# Patient Record
Sex: FEMALE | Race: WHITE | Employment: UNEMPLOYED | URBAN - METROPOLITAN AREA
[De-identification: names, ages, dates, MRNs, and addresses within clinical notes are randomized per-mention and may not be internally consistent; named-entity substitution may affect disease eponyms.]

---

## 2017-01-12 RX ORDER — ALBUTEROL SULFATE 1.5 MG/3ML
SOLUTION RESPIRATORY (INHALATION)
Qty: 300 ML | Refills: 6 | Status: SHIPPED | OUTPATIENT
Start: 2017-01-12

## 2017-01-18 ENCOUNTER — TELEPHONE (OUTPATIENT)
Dept: FAMILY MEDICINE CLINIC | Facility: CLINIC | Age: 32
End: 2017-01-18

## 2017-01-20 NOTE — TELEPHONE ENCOUNTER
Patient calling and states she wants to see ENT for a constant post nasal drip and reoccurring ear infections.

## 2017-04-19 ENCOUNTER — HOSPITAL ENCOUNTER (EMERGENCY)
Facility: HOSPITAL | Age: 32
Discharge: HOME | End: 2017-04-19
Payer: COMMERCIAL

## 2017-04-19 ENCOUNTER — APPOINTMENT (OUTPATIENT)
Dept: GENERAL RADIOLOGY | Facility: HOSPITAL | Age: 32
End: 2017-04-19
Payer: COMMERCIAL

## 2017-04-19 VITALS
TEMPERATURE: 99 F | RESPIRATION RATE: 18 BRPM | WEIGHT: 238 LBS | HEIGHT: 65 IN | OXYGEN SATURATION: 96 % | DIASTOLIC BLOOD PRESSURE: 76 MMHG | SYSTOLIC BLOOD PRESSURE: 142 MMHG | BODY MASS INDEX: 39.65 KG/M2 | HEART RATE: 126 BPM

## 2017-04-19 DIAGNOSIS — J11.1 INFLUENZA: ICD-10-CM

## 2017-04-19 DIAGNOSIS — J45.901 ASTHMA EXACERBATION: Primary | ICD-10-CM

## 2017-04-19 PROCEDURE — 99283 EMERGENCY DEPT VISIT LOW MDM: CPT

## 2017-04-19 PROCEDURE — 94640 AIRWAY INHALATION TREATMENT: CPT

## 2017-04-19 PROCEDURE — 71020 XR CHEST PA + LAT CHEST (CPT=71020): CPT

## 2017-04-19 RX ORDER — IBUPROFEN 600 MG/1
600 TABLET ORAL EVERY 8 HOURS PRN
Qty: 10 TABLET | Refills: 0 | Status: SHIPPED | OUTPATIENT
Start: 2017-04-19 | End: 2017-12-20

## 2017-04-19 RX ORDER — PREDNISONE 20 MG/1
60 TABLET ORAL ONCE
Status: COMPLETED | OUTPATIENT
Start: 2017-04-19 | End: 2017-04-19

## 2017-04-19 RX ORDER — IPRATROPIUM BROMIDE AND ALBUTEROL SULFATE 2.5; .5 MG/3ML; MG/3ML
3 SOLUTION RESPIRATORY (INHALATION) ONCE
Status: COMPLETED | OUTPATIENT
Start: 2017-04-19 | End: 2017-04-19

## 2017-04-19 RX ORDER — OSELTAMIVIR PHOSPHATE 75 MG/1
75 CAPSULE ORAL 2 TIMES DAILY
Qty: 10 CAPSULE | Refills: 0 | Status: SHIPPED | OUTPATIENT
Start: 2017-04-19 | End: 2017-04-24

## 2017-04-19 RX ORDER — ALBUTEROL SULFATE 90 UG/1
2 AEROSOL, METERED RESPIRATORY (INHALATION) EVERY 4 HOURS PRN
Qty: 1 INHALER | Refills: 0 | Status: SHIPPED | OUTPATIENT
Start: 2017-04-19 | End: 2017-05-19

## 2017-04-19 RX ORDER — PREDNISONE 20 MG/1
60 TABLET ORAL DAILY
Qty: 12 TABLET | Refills: 0 | Status: SHIPPED | OUTPATIENT
Start: 2017-04-19 | End: 2017-04-23

## 2017-04-20 NOTE — ED INITIAL ASSESSMENT (HPI)
Patient with fevers this morning, max temp 104 at home. She also c/o cough and congestion. She took Mucinex and Advil at home.

## 2017-04-20 NOTE — ED PROVIDER NOTES
Patient Seen in: [facility] Emergency Department    History   Patient presents with:  Fever (infectious)  Cough/URI    Stated Complaint: FEVER    HPI    Patient is a pleasant 28-year-old who presents the ER, she has a history of asthma which is mild Spacer/Aero Chamber Mouthpiece Does not apply Misc,  Use with inhaler   ibuprofen 600 MG Oral Tab,  Take 1 tablet (600 mg total) by mouth every 8 (eight) hours as needed for Pain.    ALBUTEROL SULFATE 1.25 MG/3ML Inhalation Nebu Soln,  USE 1 VIAL VIA NEBULI Wt 107. 956 kg  BMI 39.61 kg/m2  SpO2 96%  LMP 03/29/2017 (Exact Date)  Breastfeeding? Unknown        Physical Exam    GENERAL APPEARANCE:     Well developed, well nourished, alert       Head: Normocephalic and atraumatic.      Sclera anicteric, conjunctiva also c/o cough and congestion. She took Mucinex and Advil at home. FINDINGS:  LUNGS:  No focal consolidation. Normal vascularity. CARDIAC:  Normal size cardiac silhouette. MEDIASTINUM:  Normal. PLEURA:  Normal.  No pleural effusions.  BONES:  Normal fo for Wheezing. Qty: 1 Inhaler Refills: 0    Spacer/Aero Chamber Mouthpiece Does not apply Misc  Use with inhaler  Qty: 1 each Refills: 0    ibuprofen 600 MG Oral Tab  Take 1 tablet (600 mg total) by mouth every 8 (eight) hours as needed for Pain.   Qty: 10

## 2017-10-24 ENCOUNTER — HOSPITAL ENCOUNTER (OUTPATIENT)
Age: 32
Discharge: HOME | End: 2017-10-24
Attending: FAMILY MEDICINE
Payer: COMMERCIAL

## 2017-10-24 ENCOUNTER — APPOINTMENT (OUTPATIENT)
Dept: GENERAL RADIOLOGY | Age: 32
End: 2017-10-24
Attending: FAMILY MEDICINE
Payer: COMMERCIAL

## 2017-10-24 VITALS
BODY MASS INDEX: 40.82 KG/M2 | WEIGHT: 245 LBS | RESPIRATION RATE: 20 BRPM | SYSTOLIC BLOOD PRESSURE: 130 MMHG | HEIGHT: 65 IN | TEMPERATURE: 98 F | DIASTOLIC BLOOD PRESSURE: 82 MMHG | HEART RATE: 94 BPM | OXYGEN SATURATION: 98 %

## 2017-10-24 DIAGNOSIS — R09.82 POST-NASAL DRIP: ICD-10-CM

## 2017-10-24 DIAGNOSIS — J20.9 BRONCHITIS WITH BRONCHOSPASM: Primary | ICD-10-CM

## 2017-10-24 PROCEDURE — 71020 XR CHEST PA + LAT CHEST (CPT=71020): CPT | Performed by: FAMILY MEDICINE

## 2017-10-24 PROCEDURE — 99213 OFFICE O/P EST LOW 20 MIN: CPT

## 2017-10-24 PROCEDURE — 99204 OFFICE O/P NEW MOD 45 MIN: CPT

## 2017-10-24 PROCEDURE — 81025 URINE PREGNANCY TEST: CPT | Performed by: FAMILY MEDICINE

## 2017-10-24 RX ORDER — ALBUTEROL SULFATE 90 UG/1
2 AEROSOL, METERED RESPIRATORY (INHALATION) EVERY 4 HOURS PRN
Qty: 1 INHALER | Refills: 0 | Status: SHIPPED | OUTPATIENT
Start: 2017-10-24 | End: 2017-11-02

## 2017-10-24 RX ORDER — ALBUTEROL SULFATE 2.5 MG/3ML
2.5 SOLUTION RESPIRATORY (INHALATION) EVERY 4 HOURS PRN
Qty: 30 AMPULE | Refills: 0 | Status: SHIPPED | OUTPATIENT
Start: 2017-10-24 | End: 2017-11-23

## 2017-10-24 RX ORDER — FLUTICASONE PROPIONATE AND SALMETEROL 250; 50 UG/1; UG/1
1 POWDER RESPIRATORY (INHALATION) 2 TIMES DAILY
Qty: 1 PACKAGE | Refills: 0 | Status: SHIPPED | OUTPATIENT
Start: 2017-10-24 | End: 2017-11-02

## 2017-10-24 RX ORDER — ALPRAZOLAM 0.5 MG/1
0.5 TABLET ORAL NIGHTLY PRN
COMMUNITY

## 2017-10-24 RX ORDER — PREDNISONE 10 MG/1
TABLET ORAL
Qty: 30 TABLET | Refills: 0 | Status: SHIPPED | OUTPATIENT
Start: 2017-10-24 | End: 2017-11-02 | Stop reason: ALTCHOICE

## 2017-10-24 NOTE — ED INITIAL ASSESSMENT (HPI)
Pt c/o URI s/s including sore throat, nasal congestion, post nasal drip and mild cough x 2 weeks.  Pt was in [place] visiting family for 5 days, went to urgent care in [facility]) on 10/15/17, was prescribed Breo Ellipta inhaler albuterol inhaler and Clarithromyc

## 2017-10-24 NOTE — ED PROVIDER NOTES
Patient Seen in: [facility]    History   Patient presents with:  Cough/URI    Stated Complaint: CHEST COLD STARTING TWO WEEKS AGO, FEVER TODAY    HPI  29-year-old female coming in with complaints of URI symptoms - sore thro above.    PSFH elements reviewed from today and agreed except as otherwise stated in HPI.     Physical Exam   ED Triage Vitals [10/24/17 1704]  BP: 130/82  Pulse: 94  Resp: 20  Temp: 97.7 °F (36.5 °C)  Temp src: Temporal  SpO2: 98 %  O2 Device: None (Room a 9,10: 10 mg/1 pill once daily          Dispense:  30 tablet          Refill:  0      fluticasone-salmeterol (ADVAIR DISKUS) 250-50 MCG/DOSE Inhalation Aerosol Powder, Breath Activated          Sig: Inhale 1 puff into the lungs 2 (two) times daily. taking these medications    albuterol sulfate (2.5 MG/3ML) 0.083% Inhalation Nebu Soln  Take 3 mL (2.5 mg total) by nebulization every 4 (four) hours as needed for Wheezing or Shortness of Breath., Normal, Disp-30 ampule, R-0    !!  Albuterol Sulfate HFA 10

## 2017-11-01 ENCOUNTER — TELEPHONE (OUTPATIENT)
Dept: FAMILY MEDICINE CLINIC | Facility: CLINIC | Age: 32
End: 2017-11-01

## 2017-11-01 NOTE — TELEPHONE ENCOUNTER
UNABLE to conform NEW PT sick appt w/Luis Enrique Hopkins on 11/2/17. Rhoda Spar Rhoda Spar Voice mail FULL

## 2017-11-02 ENCOUNTER — OFFICE VISIT (OUTPATIENT)
Dept: FAMILY MEDICINE CLINIC | Facility: CLINIC | Age: 32
End: 2017-11-02

## 2017-11-02 VITALS
HEIGHT: 65 IN | RESPIRATION RATE: 12 BRPM | HEART RATE: 68 BPM | TEMPERATURE: 98 F | DIASTOLIC BLOOD PRESSURE: 68 MMHG | SYSTOLIC BLOOD PRESSURE: 120 MMHG | BODY MASS INDEX: 40.82 KG/M2 | WEIGHT: 245 LBS

## 2017-11-02 DIAGNOSIS — R05.9 COUGH: Primary | ICD-10-CM

## 2017-11-02 DIAGNOSIS — J45.31 MILD PERSISTENT ASTHMA WITH EXACERBATION: ICD-10-CM

## 2017-11-02 DIAGNOSIS — M13.0 POLYARTHROPATHY: ICD-10-CM

## 2017-11-02 PROCEDURE — 99203 OFFICE O/P NEW LOW 30 MIN: CPT | Performed by: PHYSICIAN ASSISTANT

## 2017-11-02 RX ORDER — PHENTERMINE HYDROCHLORIDE 37.5 MG/1
37.5 TABLET ORAL
COMMUNITY
Start: 2017-09-28 | End: 2018-02-05

## 2017-11-02 NOTE — PROGRESS NOTES
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  Symptoms have been present for a few weeks; she was placed on antibiotics and given prednisone; she total) by nebulization every 4 (four) hours as needed for Wheezing or Shortness of Breath.  Disp: 30 ampule Rfl: 0   Spacer/Aero Chamber Mouthpiece Does not apply Misc Use with inhaler Disp: 1 each Rfl: 0   ibuprofen 600 MG Oral Tab Take 1 tablet (600 mg to No edema, bleeding, or rhinorrhea; no septal deviation   Throat: No erythema; no oral lesions or exudates; tonsils not enlarged   Eyes: PERRLA; no ulcers, abrasions, or FBs; lids normal; no edema; conjunctiva clear    Sinus: No TTP over frontal/ethmoid sin unspecified     Asthma exacerbation     Panic anxiety syndrome     Depression     Polyarthropathy     Chronic midline low back pain with sciatica      No orders of the defined types were placed in this encounter.       Signed Prescriptions Disp Refills    F

## 2017-11-02 NOTE — PATIENT INSTRUCTIONS
What Is Arthritis? Arthritis is a disease that affects the joints. Joints are the parts where bones meet and move. It can affect any joint in your body. There are many types of arthritis, including osteoarthritis, rheumatoid arthritis, gout and lupus.  I © 6912-5825 [employer] [address]. [address] [facility], [facility] [place]. All rights reserved. This information is not intended as a substitute for professional medical care. Always follow your healthcare professional's instructions.

## 2017-12-04 ENCOUNTER — OFFICE VISIT (OUTPATIENT)
Dept: FAMILY MEDICINE CLINIC | Facility: CLINIC | Age: 32
End: 2017-12-04

## 2017-12-04 VITALS
TEMPERATURE: 99 F | SYSTOLIC BLOOD PRESSURE: 110 MMHG | DIASTOLIC BLOOD PRESSURE: 84 MMHG | BODY MASS INDEX: 42.38 KG/M2 | HEIGHT: 65 IN | WEIGHT: 254.38 LBS | HEART RATE: 84 BPM | RESPIRATION RATE: 16 BRPM

## 2017-12-04 DIAGNOSIS — F32.4 MAJOR DEPRESSIVE DISORDER WITH SINGLE EPISODE, IN PARTIAL REMISSION (HCC): ICD-10-CM

## 2017-12-04 DIAGNOSIS — R73.01 ABNORMAL FASTING GLUCOSE: ICD-10-CM

## 2017-12-04 DIAGNOSIS — D22.9 ATYPICAL MOLE: Primary | ICD-10-CM

## 2017-12-04 DIAGNOSIS — F41.0 PANIC ANXIETY SYNDROME: ICD-10-CM

## 2017-12-04 DIAGNOSIS — R63.5 ABNORMAL WEIGHT GAIN: ICD-10-CM

## 2017-12-04 DIAGNOSIS — Z01.419 WELL WOMAN EXAM WITH ROUTINE GYNECOLOGICAL EXAM: ICD-10-CM

## 2017-12-04 DIAGNOSIS — M25.50 ARTHRALGIA, UNSPECIFIED JOINT: ICD-10-CM

## 2017-12-04 DIAGNOSIS — G89.29 CHRONIC MIDLINE LOW BACK PAIN WITH SCIATICA, SCIATICA LATERALITY UNSPECIFIED: ICD-10-CM

## 2017-12-04 DIAGNOSIS — M13.0 POLYARTHROPATHY: ICD-10-CM

## 2017-12-04 DIAGNOSIS — M54.40 CHRONIC MIDLINE LOW BACK PAIN WITH SCIATICA, SCIATICA LATERALITY UNSPECIFIED: ICD-10-CM

## 2017-12-04 DIAGNOSIS — M43.16 SPONDYLOLISTHESIS OF LUMBAR REGION: ICD-10-CM

## 2017-12-04 PROCEDURE — 87624 HPV HI-RISK TYP POOLED RSLT: CPT | Performed by: FAMILY MEDICINE

## 2017-12-04 PROCEDURE — 88175 CYTOPATH C/V AUTO FLUID REDO: CPT | Performed by: FAMILY MEDICINE

## 2017-12-04 PROCEDURE — 99395 PREV VISIT EST AGE 18-39: CPT | Performed by: FAMILY MEDICINE

## 2017-12-04 PROCEDURE — G0439 PPPS, SUBSEQ VISIT: HCPCS | Performed by: FAMILY MEDICINE

## 2017-12-04 PROCEDURE — 99213 OFFICE O/P EST LOW 20 MIN: CPT | Performed by: FAMILY MEDICINE

## 2017-12-04 NOTE — PROGRESS NOTES
SUBJECTIVE:  Patient presents with:  Physical: WWE and pap. G 5  P 4    Asthma: ACT score e19/25  AAP updated and pended  Referral: Has new insurance.  Needs Psychiatrist,Neuro-Surgeon, Endocrinologist, Dermatologist and Rheumatologist referral    HPI: cancer? no  Breast Cancer screening: Family history of breast cancer? Yes- maternal grandmother  STI: High risk sexual behavior?  no Desires testing for STIs? no    ROS:   Review of Systems   Constitutional: Positive for fatigue and unexpected weight change Antibiotics           Comment:Other reaction(s): Discomfort    OBJECTIVE:  PHYSICAL EXAM:   12/04/17  1214   BP: 110/84   Pulse: 84   Resp: 16   Temp: 99.1 °F (37.3 °C)   TempSrc: Oral   Weight: 254 lb 6.4 oz   Height: 65\"     Physical Examination: Alis Escobar C-REACTIVE PROTEIN    JULIA, DIRECT, REFLEX TO 9 ENAS    CYCLIC CITRULLINATE PEP.  IGG    Chronic midline low back pain with sciatica    Relevant Orders    NEUROSURGERY - INTERNAL      Other Visit Diagnoses     Atypical mole    -  Primary    Relevant Orders NAVIGATOR    Spondylolisthesis of lumbar region; Chronic midline low back pain with sciatica, sciatica laterality unspecified  Given history of surgery, agree with chronic follow up with surgeon.  Referral placed.  -     NEUROSURGERY - INTERNAL    Abnormal

## 2017-12-07 ENCOUNTER — TELEPHONE (OUTPATIENT)
Dept: FAMILY MEDICINE CLINIC | Facility: CLINIC | Age: 32
End: 2017-12-07

## 2017-12-07 DIAGNOSIS — M13.0 POLYARTHROPATHY: Primary | ICD-10-CM

## 2017-12-07 NOTE — TELEPHONE ENCOUNTER
Patient was referred to a rheumatologist, their office is not booking appointments till the end of March 2018. Patient is wanting to know if she can get another recommendation.

## 2017-12-08 ENCOUNTER — LAB ENCOUNTER (OUTPATIENT)
Dept: LAB | Age: 32
End: 2017-12-08
Attending: FAMILY MEDICINE
Payer: COMMERCIAL

## 2017-12-08 DIAGNOSIS — M13.0 POLYARTHROPATHY: ICD-10-CM

## 2017-12-08 DIAGNOSIS — R63.5 ABNORMAL WEIGHT GAIN: ICD-10-CM

## 2017-12-08 DIAGNOSIS — Z01.419 WELL WOMAN EXAM WITH ROUTINE GYNECOLOGICAL EXAM: ICD-10-CM

## 2017-12-08 DIAGNOSIS — M25.50 ARTHRALGIA, UNSPECIFIED JOINT: ICD-10-CM

## 2017-12-08 PROCEDURE — 86225 DNA ANTIBODY NATIVE: CPT

## 2017-12-08 PROCEDURE — 86431 RHEUMATOID FACTOR QUANT: CPT

## 2017-12-08 PROCEDURE — 80053 COMPREHEN METABOLIC PANEL: CPT

## 2017-12-08 PROCEDURE — 86200 CCP ANTIBODY: CPT

## 2017-12-08 PROCEDURE — 86038 ANTINUCLEAR ANTIBODIES: CPT

## 2017-12-08 PROCEDURE — 84443 ASSAY THYROID STIM HORMONE: CPT

## 2017-12-08 PROCEDURE — 80061 LIPID PANEL: CPT

## 2017-12-08 PROCEDURE — 85652 RBC SED RATE AUTOMATED: CPT

## 2017-12-08 PROCEDURE — 85027 COMPLETE CBC AUTOMATED: CPT

## 2017-12-08 PROCEDURE — 36415 COLL VENOUS BLD VENIPUNCTURE: CPT

## 2017-12-08 PROCEDURE — 86235 NUCLEAR ANTIGEN ANTIBODY: CPT

## 2017-12-08 PROCEDURE — 86140 C-REACTIVE PROTEIN: CPT

## 2017-12-08 NOTE — TELEPHONE ENCOUNTER
We can place referral for Dr. Ela Yuen or Leonie Loza (although we discussed that often they book pretty far out)  Thanks,  Anais Munoz, DO

## 2017-12-11 NOTE — TELEPHONE ENCOUNTER
Rigoberto Haddad  [address] [facility]      [address]        Called and gave patient name and number of new MD

## 2017-12-14 ENCOUNTER — TELEPHONE (OUTPATIENT)
Dept: FAMILY MEDICINE CLINIC | Facility: CLINIC | Age: 32
End: 2017-12-14

## 2017-12-14 ENCOUNTER — OFFICE VISIT (OUTPATIENT)
Dept: SURGERY | Facility: CLINIC | Age: 32
End: 2017-12-14

## 2017-12-14 VITALS — SYSTOLIC BLOOD PRESSURE: 112 MMHG | HEART RATE: 80 BPM | DIASTOLIC BLOOD PRESSURE: 70 MMHG | RESPIRATION RATE: 18 BRPM

## 2017-12-14 DIAGNOSIS — M54.2 NECK PAIN, CHRONIC: ICD-10-CM

## 2017-12-14 DIAGNOSIS — R20.2 BILATERAL NUMBNESS AND TINGLING OF ARMS AND LEGS: ICD-10-CM

## 2017-12-14 DIAGNOSIS — R29.898 LEG WEAKNESS, BILATERAL: ICD-10-CM

## 2017-12-14 DIAGNOSIS — G89.29 CHRONIC BILATERAL LOW BACK PAIN WITHOUT SCIATICA: ICD-10-CM

## 2017-12-14 DIAGNOSIS — G89.29 NECK PAIN, CHRONIC: ICD-10-CM

## 2017-12-14 DIAGNOSIS — R20.0 BILATERAL NUMBNESS AND TINGLING OF ARMS AND LEGS: ICD-10-CM

## 2017-12-14 DIAGNOSIS — M54.50 CHRONIC BILATERAL LOW BACK PAIN WITHOUT SCIATICA: ICD-10-CM

## 2017-12-14 DIAGNOSIS — Z98.1 S/P LUMBAR FUSION: Primary | ICD-10-CM

## 2017-12-14 PROCEDURE — 99213 OFFICE O/P EST LOW 20 MIN: CPT | Performed by: PHYSICIAN ASSISTANT

## 2017-12-14 NOTE — PROGRESS NOTES
Neurosurgery Clinic Visit  2017    Vitor Cabrera PCP:  Roberto Kirby DO DOB 3/29/1985 MRN [id number]     HISTORY OF PRESENT ILLNESS:  Vitor Cabrera is a very pleasant 28year old female who is here for low back pain.   She was last seen spine, as well as MRIs cervical and lumbar spine. We briefly discussed different treatment options for neurogenic claudication. She will RTC after imaging to discuss treatment options.     Case discussed with Dr. Yaya Arevalo, and he is in agreement with the

## 2017-12-14 NOTE — PATIENT INSTRUCTIONS
Refill policies:    • Allow 2-3 business days for refills; controlled substances may take longer.   • Contact your pharmacy at least 5 days prior to running out of medication and have them send an electronic request or submit request through the [facility] have a procedure or additional testing performed. [facility]) will contact your insurance carrier to obtain pre-certification or prior authorization.     Unfortunately, [facility] has seen an increase in denial of payment even though the p

## 2017-12-14 NOTE — PROGRESS NOTES
Patient states pain is significantly worse. Walking makes pain worse. Legs feel heavy, weak, tired. If she stands too long gets numbness/tingling. Numbness to left foot is now constant which is new.     Patient also had one episode of urine incontinence in

## 2017-12-15 PROBLEM — H02.88B MEIBOMIAN GLAND DYSFUNCTION (MGD), BILATERAL, BOTH UPPER AND LOWER LIDS: Status: ACTIVE | Noted: 2017-06-08

## 2017-12-15 PROBLEM — H02.88A MEIBOMIAN GLAND DYSFUNCTION (MGD), BILATERAL, BOTH UPPER AND LOWER LIDS: Status: ACTIVE | Noted: 2017-06-08

## 2017-12-18 ENCOUNTER — OFFICE VISIT (OUTPATIENT)
Dept: FAMILY MEDICINE CLINIC | Facility: CLINIC | Age: 32
End: 2017-12-18

## 2017-12-18 VITALS
BODY MASS INDEX: 42.71 KG/M2 | RESPIRATION RATE: 16 BRPM | HEIGHT: 65 IN | HEART RATE: 84 BPM | TEMPERATURE: 98 F | WEIGHT: 256.38 LBS | SYSTOLIC BLOOD PRESSURE: 106 MMHG | DIASTOLIC BLOOD PRESSURE: 54 MMHG

## 2017-12-18 DIAGNOSIS — F32.4 MAJOR DEPRESSIVE DISORDER WITH SINGLE EPISODE, IN PARTIAL REMISSION (HCC): ICD-10-CM

## 2017-12-18 DIAGNOSIS — F41.0 PANIC ANXIETY SYNDROME: ICD-10-CM

## 2017-12-18 DIAGNOSIS — M25.561 CHRONIC PAIN OF BOTH KNEES: ICD-10-CM

## 2017-12-18 DIAGNOSIS — G89.29 CHRONIC PAIN OF BOTH KNEES: ICD-10-CM

## 2017-12-18 DIAGNOSIS — J45.40 MODERATE PERSISTENT ASTHMA WITHOUT COMPLICATION: ICD-10-CM

## 2017-12-18 DIAGNOSIS — M25.512 ACUTE PAIN OF LEFT SHOULDER: Primary | ICD-10-CM

## 2017-12-18 DIAGNOSIS — M25.562 CHRONIC PAIN OF BOTH KNEES: ICD-10-CM

## 2017-12-18 PROCEDURE — 99214 OFFICE O/P EST MOD 30 MIN: CPT | Performed by: FAMILY MEDICINE

## 2017-12-18 RX ORDER — ESCITALOPRAM OXALATE 20 MG/1
30 TABLET ORAL DAILY
Qty: 45 TABLET | Refills: 0 | Status: SHIPPED | OUTPATIENT
Start: 2017-12-18 | End: 2018-01-15

## 2017-12-18 NOTE — PROGRESS NOTES
Chief Complaint:  Patient presents with:  Asthma: follow up  Musculoskeletal Problem: Pt's knees are creaky for 4 years. Worse over past 3 months.   Shoulder Pain: left shoulder pain  Test Results: review    HPI:  This is a 28year old female patient presen History:   Procedure Laterality Date   •   , , ,    • Electrocardiogram, complete  12    Scanned to Media Tab   • Other surgical history      Spondylolisthesis L5 instrumentation and fusion   • Tubal ligation         Fa into the lungs 2 (two) times daily.  Disp: 1 Inhaler Rfl: 5     Allergies:    Adhesive Tape               Comment:Pain, irritation  Amoxicillin                 Comment:Other reaction(s): Discomfort  Cefaclor                    Comment:Other reaction(s): Dis PLAN:  Discussed the following assessment   Problem List Items Addressed This Visit        Mental Health    Panic anxiety syndrome    Relevant Medications    escitalopram 20 MG Oral Tab    Major depressive disorder, single episode    Relevant Medications

## 2017-12-20 ENCOUNTER — TELEPHONE (OUTPATIENT)
Dept: FAMILY MEDICINE CLINIC | Facility: CLINIC | Age: 32
End: 2017-12-20

## 2017-12-20 DIAGNOSIS — M25.562 CHRONIC PAIN OF BOTH KNEES: Primary | ICD-10-CM

## 2017-12-20 DIAGNOSIS — M25.561 CHRONIC PAIN OF BOTH KNEES: Primary | ICD-10-CM

## 2017-12-20 DIAGNOSIS — G89.29 CHRONIC PAIN OF BOTH KNEES: Primary | ICD-10-CM

## 2017-12-20 RX ORDER — IBUPROFEN 800 MG/1
800 TABLET ORAL EVERY 8 HOURS PRN
Qty: 90 TABLET | Refills: 0 | Status: SHIPPED | OUTPATIENT
Start: 2017-12-20 | End: 2018-01-23

## 2017-12-20 RX ORDER — MELOXICAM 7.5 MG/1
TABLET ORAL DAILY
Qty: 60 TABLET | Refills: 1 | Status: SHIPPED | OUTPATIENT
Start: 2017-12-20 | End: 2018-01-23

## 2017-12-20 NOTE — TELEPHONE ENCOUNTER
Pt states that Dr Keerthi Dodge was to call in Ibuprofen for Anti-Infammatory of her Joints. Requesting this med to go to [facility] on [address]

## 2017-12-20 NOTE — TELEPHONE ENCOUNTER
Patient was given 3 names and numbers for a psychiatrist. 1 office stated her insurance was not accepted, second kept ringing than going to [facility], and the third office cannot get her in till March. Patient would like to discuss other options.

## 2017-12-20 NOTE — TELEPHONE ENCOUNTER
LOV 12/18/17. Pt said that Dr Shana Cannon was going to order an antiinflammatory for her. Notified pt that Dr Shana Cannon is not in office today and that this will be addressed tomorrow. Routed to Dr Shana Cannon.

## 2017-12-21 NOTE — TELEPHONE ENCOUNTER
I think she received those names from behavioral health. Will have to either discuss again with them OR call to see what kind of paymeny plan her previous psychiatrist can work out. Please let me know if you have any questions.   [address], DO 12/20/20

## 2017-12-21 NOTE — TELEPHONE ENCOUNTER
I called [facility] they gave me the number for the patient to call to get in network providers [phone] called patient explained process then gave her the number to call to see who she can go to she will call back if this does not work

## 2017-12-28 ENCOUNTER — TELEPHONE (OUTPATIENT)
Dept: FAMILY MEDICINE CLINIC | Facility: CLINIC | Age: 32
End: 2017-12-28

## 2017-12-28 DIAGNOSIS — N91.2 ABSENCE OF MENSTRUATION: ICD-10-CM

## 2017-12-28 DIAGNOSIS — R63.5 ABNORMAL WEIGHT GAIN: Primary | ICD-10-CM

## 2017-12-28 DIAGNOSIS — E03.4 HYPOTHYROIDISM DUE TO ACQUIRED ATROPHY OF THYROID: ICD-10-CM

## 2017-12-28 DIAGNOSIS — E66.09 CLASS 2 OBESITY DUE TO EXCESS CALORIES IN ADULT, UNSPECIFIED BMI, UNSPECIFIED WHETHER SERIOUS COMORBIDITY PRESENT: ICD-10-CM

## 2017-12-28 DIAGNOSIS — F32.A DEPRESSION, UNSPECIFIED DEPRESSION TYPE: ICD-10-CM

## 2017-12-28 NOTE — TELEPHONE ENCOUNTER
Referral signed. OK to change. We were trying to keep continuity of care however if not possible, can establish in net work.   Thanks,  [address], DO

## 2017-12-28 NOTE — TELEPHONE ENCOUNTER
Recommendation/Referral request   Received: Yesterday   Message Contents   Case [facility] Clinical Staff Cc: [facility]   Phone Number: [phone]             Patient Name: Andrew Keating   : [birth year]   Reason for th

## 2017-12-28 NOTE — TELEPHONE ENCOUNTER
Reviewed EPIC Labs, last CMP on 12/7/17 was within normal limits = 91,   Endocrinology Referral was placed by Dr Zeenat Berry on 12/4/17 at [address] [place] [address] for Ellen Pérez, this provider is out-of-network with P  Msg forward to Dr Zeenat Berry,   Referral PENDING for Dr Geetha Torres

## 2018-01-05 ENCOUNTER — HOSPITAL ENCOUNTER (OUTPATIENT)
Dept: GENERAL RADIOLOGY | Facility: HOSPITAL | Age: 33
Discharge: HOME | End: 2018-01-05
Attending: PHYSICIAN ASSISTANT
Payer: COMMERCIAL

## 2018-01-05 ENCOUNTER — HOSPITAL ENCOUNTER (OUTPATIENT)
Dept: GENERAL RADIOLOGY | Facility: HOSPITAL | Age: 33
Discharge: HOME | End: 2018-01-05
Attending: FAMILY MEDICINE
Payer: COMMERCIAL

## 2018-01-05 DIAGNOSIS — R29.898 LEG WEAKNESS, BILATERAL: ICD-10-CM

## 2018-01-05 DIAGNOSIS — G89.29 CHRONIC BILATERAL LOW BACK PAIN WITHOUT SCIATICA: ICD-10-CM

## 2018-01-05 DIAGNOSIS — M54.2 NECK PAIN, CHRONIC: ICD-10-CM

## 2018-01-05 DIAGNOSIS — M25.512 ACUTE PAIN OF LEFT SHOULDER: ICD-10-CM

## 2018-01-05 DIAGNOSIS — G89.29 CHRONIC PAIN OF BOTH KNEES: ICD-10-CM

## 2018-01-05 DIAGNOSIS — Z98.1 S/P LUMBAR FUSION: ICD-10-CM

## 2018-01-05 DIAGNOSIS — G89.29 NECK PAIN, CHRONIC: ICD-10-CM

## 2018-01-05 DIAGNOSIS — R20.0 BILATERAL NUMBNESS AND TINGLING OF ARMS AND LEGS: ICD-10-CM

## 2018-01-05 DIAGNOSIS — R20.2 BILATERAL NUMBNESS AND TINGLING OF ARMS AND LEGS: ICD-10-CM

## 2018-01-05 DIAGNOSIS — M25.562 CHRONIC PAIN OF BOTH KNEES: ICD-10-CM

## 2018-01-05 DIAGNOSIS — M54.50 CHRONIC BILATERAL LOW BACK PAIN WITHOUT SCIATICA: ICD-10-CM

## 2018-01-05 DIAGNOSIS — M25.561 CHRONIC PAIN OF BOTH KNEES: ICD-10-CM

## 2018-01-05 PROCEDURE — 73562 X-RAY EXAM OF KNEE 3: CPT | Performed by: FAMILY MEDICINE

## 2018-01-05 PROCEDURE — 72052 X-RAY EXAM NECK SPINE 6/>VWS: CPT | Performed by: PHYSICIAN ASSISTANT

## 2018-01-05 PROCEDURE — 73030 X-RAY EXAM OF SHOULDER: CPT | Performed by: FAMILY MEDICINE

## 2018-01-05 PROCEDURE — 72114 X-RAY EXAM L-S SPINE BENDING: CPT | Performed by: PHYSICIAN ASSISTANT

## 2018-01-07 DIAGNOSIS — M25.869 PATELLOFEMORAL DYSFUNCTION, UNSPECIFIED LATERALITY: Primary | ICD-10-CM

## 2018-01-08 DIAGNOSIS — G89.29 CHRONIC RIGHT SHOULDER PAIN: Primary | ICD-10-CM

## 2018-01-08 DIAGNOSIS — M25.511 CHRONIC RIGHT SHOULDER PAIN: Primary | ICD-10-CM

## 2018-01-11 ENCOUNTER — OFFICE VISIT (OUTPATIENT)
Dept: OTOLARYNGOLOGY | Facility: CLINIC | Age: 33
End: 2018-01-11

## 2018-01-11 VITALS
TEMPERATURE: 96 F | DIASTOLIC BLOOD PRESSURE: 70 MMHG | BODY MASS INDEX: 42.71 KG/M2 | HEIGHT: 65 IN | WEIGHT: 256.38 LBS | SYSTOLIC BLOOD PRESSURE: 110 MMHG

## 2018-01-11 DIAGNOSIS — J34.2 DEVIATED NASAL SEPTUM: Primary | ICD-10-CM

## 2018-01-11 PROCEDURE — 99212 OFFICE O/P EST SF 10 MIN: CPT | Performed by: OTOLARYNGOLOGY

## 2018-01-11 PROCEDURE — 99214 OFFICE O/P EST MOD 30 MIN: CPT | Performed by: OTOLARYNGOLOGY

## 2018-01-11 RX ORDER — ESCITALOPRAM OXALATE 10 MG/1
TABLET ORAL
Refills: 0 | COMMUNITY
Start: 2017-12-20 | End: 2018-01-23

## 2018-01-11 RX ORDER — MELOXICAM 15 MG/1
15 TABLET ORAL
COMMUNITY
End: 2018-01-23

## 2018-01-11 RX ORDER — LEVOTHYROXINE SODIUM 0.07 MG/1
75 TABLET ORAL
COMMUNITY
Start: 2017-12-21

## 2018-01-11 RX ORDER — ESCITALOPRAM OXALATE 20 MG/1
30 TABLET ORAL
COMMUNITY
End: 2018-01-23

## 2018-01-14 ENCOUNTER — HOSPITAL ENCOUNTER (OUTPATIENT)
Dept: MRI IMAGING | Facility: HOSPITAL | Age: 33
Discharge: HOME | End: 2018-01-14
Attending: PHYSICIAN ASSISTANT
Payer: COMMERCIAL

## 2018-01-14 DIAGNOSIS — R29.898 LEG WEAKNESS, BILATERAL: ICD-10-CM

## 2018-01-14 DIAGNOSIS — M54.2 NECK PAIN, CHRONIC: ICD-10-CM

## 2018-01-14 DIAGNOSIS — Z98.1 S/P LUMBAR FUSION: ICD-10-CM

## 2018-01-14 DIAGNOSIS — G89.29 NECK PAIN, CHRONIC: ICD-10-CM

## 2018-01-14 DIAGNOSIS — R20.0 BILATERAL NUMBNESS AND TINGLING OF ARMS AND LEGS: ICD-10-CM

## 2018-01-14 DIAGNOSIS — G89.29 CHRONIC BILATERAL LOW BACK PAIN WITHOUT SCIATICA: ICD-10-CM

## 2018-01-14 DIAGNOSIS — R20.2 BILATERAL NUMBNESS AND TINGLING OF ARMS AND LEGS: ICD-10-CM

## 2018-01-14 DIAGNOSIS — M54.50 CHRONIC BILATERAL LOW BACK PAIN WITHOUT SCIATICA: ICD-10-CM

## 2018-01-14 PROCEDURE — 72141 MRI NECK SPINE W/O DYE: CPT | Performed by: PHYSICIAN ASSISTANT

## 2018-01-15 ENCOUNTER — TELEPHONE (OUTPATIENT)
Dept: FAMILY MEDICINE CLINIC | Facility: CLINIC | Age: 33
End: 2018-01-15

## 2018-01-15 DIAGNOSIS — F41.0 PANIC ANXIETY SYNDROME: ICD-10-CM

## 2018-01-15 DIAGNOSIS — J34.2 DEVIATED NASAL SEPTUM: Primary | ICD-10-CM

## 2018-01-15 DIAGNOSIS — F32.4 MAJOR DEPRESSIVE DISORDER WITH SINGLE EPISODE, IN PARTIAL REMISSION (HCC): ICD-10-CM

## 2018-01-15 DIAGNOSIS — R06.89 BREATHING DIFFICULTY: ICD-10-CM

## 2018-01-15 NOTE — TELEPHONE ENCOUNTER
Juan Miguel [facility] Clinical Staff Cc: [facility] Referral Pool   Phone Number: [phone]             .Reason for the order/referral:Breathing Issues   PCP: Nicolette Mccormack   Refer to Provider: Char Sanchez   Specialty:ENT   Patient Insurance: Payor: I

## 2018-01-15 NOTE — TELEPHONE ENCOUNTER
1/11/18 OV complaining of chronic nasal obstruction. She has tried allergy medications throughout her lifetime and has had no luck. It seems to have difficulty breathing through her nose especially when she sleeps. No sinus signs or symptoms.   No other

## 2018-01-15 NOTE — TELEPHONE ENCOUNTER
This patient has NO active insurance attached and we cannot submit a referral without insurance. Front Staff, please contact the patient and add active insurance info. Sent the TE back to the Clinical Supervisor when done.

## 2018-01-15 NOTE — TELEPHONE ENCOUNTER
Refill request sent from pharmacy for Escitalopram. LOV 12/18/17. Will you approve refill ? Routed to Dr Kesha Galloway.

## 2018-01-16 RX ORDER — ESCITALOPRAM OXALATE 20 MG/1
TABLET ORAL
Qty: 45 TABLET | Refills: 2 | Status: SHIPPED | OUTPATIENT
Start: 2018-01-16 | End: 2018-02-05 | Stop reason: DRUGHIGH

## 2018-01-16 RX ORDER — ESCITALOPRAM OXALATE 20 MG/1
TABLET ORAL
Qty: 45 TABLET | Refills: 0 | OUTPATIENT
Start: 2018-01-16

## 2018-01-17 ENCOUNTER — HOSPITAL ENCOUNTER (OUTPATIENT)
Dept: MRI IMAGING | Facility: HOSPITAL | Age: 33
Discharge: HOME | End: 2018-01-17
Attending: PHYSICIAN ASSISTANT
Payer: COMMERCIAL

## 2018-01-17 DIAGNOSIS — G89.29 CHRONIC BILATERAL LOW BACK PAIN WITHOUT SCIATICA: ICD-10-CM

## 2018-01-17 DIAGNOSIS — R20.0 BILATERAL NUMBNESS AND TINGLING OF ARMS AND LEGS: ICD-10-CM

## 2018-01-17 DIAGNOSIS — M54.2 NECK PAIN, CHRONIC: ICD-10-CM

## 2018-01-17 DIAGNOSIS — R20.2 BILATERAL NUMBNESS AND TINGLING OF ARMS AND LEGS: ICD-10-CM

## 2018-01-17 DIAGNOSIS — G89.29 NECK PAIN, CHRONIC: ICD-10-CM

## 2018-01-17 DIAGNOSIS — R29.898 LEG WEAKNESS, BILATERAL: ICD-10-CM

## 2018-01-17 DIAGNOSIS — M54.50 CHRONIC BILATERAL LOW BACK PAIN WITHOUT SCIATICA: ICD-10-CM

## 2018-01-17 DIAGNOSIS — Z98.1 S/P LUMBAR FUSION: ICD-10-CM

## 2018-01-17 PROCEDURE — 72148 MRI LUMBAR SPINE W/O DYE: CPT | Performed by: PHYSICIAN ASSISTANT

## 2018-01-30 ENCOUNTER — OFFICE VISIT (OUTPATIENT)
Dept: SURGERY | Facility: CLINIC | Age: 33
End: 2018-01-30

## 2018-01-30 VITALS — RESPIRATION RATE: 18 BRPM | HEART RATE: 108 BPM | DIASTOLIC BLOOD PRESSURE: 70 MMHG | SYSTOLIC BLOOD PRESSURE: 122 MMHG

## 2018-01-30 DIAGNOSIS — Z98.1 S/P LUMBAR FUSION: Primary | ICD-10-CM

## 2018-01-30 DIAGNOSIS — M54.50 CHRONIC BILATERAL LOW BACK PAIN WITHOUT SCIATICA: ICD-10-CM

## 2018-01-30 DIAGNOSIS — G89.29 CHRONIC BILATERAL LOW BACK PAIN WITHOUT SCIATICA: ICD-10-CM

## 2018-01-30 DIAGNOSIS — R29.898 LEG WEAKNESS, BILATERAL: ICD-10-CM

## 2018-01-30 PROCEDURE — 99213 OFFICE O/P EST LOW 20 MIN: CPT | Performed by: PHYSICIAN ASSISTANT

## 2018-01-30 RX ORDER — CLONAZEPAM 0.5 MG/1
0.5 TABLET ORAL 2 TIMES DAILY
COMMUNITY
End: 2018-04-16

## 2018-01-30 RX ORDER — DULOXETIN HYDROCHLORIDE 30 MG/1
60 CAPSULE, DELAYED RELEASE ORAL DAILY
COMMUNITY
End: 2018-03-23 | Stop reason: DRUGHIGH

## 2018-01-30 NOTE — PATIENT INSTRUCTIONS
Refill policies:    • Allow 2-3 business days for refills; controlled substances may take longer.   • Contact your pharmacy at least 5 days prior to running out of medication and have them send an electronic request or submit request through the [facility] recommended that you have a procedure or additional testing performed. [facility]) will contact your insurance carrier to obtain pre-certification or prior authorization.     Unfortunately, [facility] has seen an increase in denial of paym

## 2018-01-30 NOTE — PROGRESS NOTES
Neurosurgery Clinic Visit  2018    Luciano Cota PCP:  Dank Mandel DO DOB 3/29/1985 MRN [id number]     HISTORY OF PRESENT ILLNESS:  Luciano Cota is a(n) 28year old female who is here for follow-up for neck and back pain.   She contin [address], [address], [address] [facility]  [phone]  1/30/2018  1:40 PM

## 2018-02-05 ENCOUNTER — OFFICE VISIT (OUTPATIENT)
Dept: FAMILY MEDICINE CLINIC | Facility: CLINIC | Age: 33
End: 2018-02-05

## 2018-02-05 VITALS
HEART RATE: 100 BPM | TEMPERATURE: 98 F | WEIGHT: 255 LBS | SYSTOLIC BLOOD PRESSURE: 128 MMHG | HEIGHT: 64.75 IN | DIASTOLIC BLOOD PRESSURE: 80 MMHG | BODY MASS INDEX: 43.01 KG/M2 | RESPIRATION RATE: 16 BRPM

## 2018-02-05 DIAGNOSIS — R30.9 URINARY PAIN: ICD-10-CM

## 2018-02-05 DIAGNOSIS — N30.01 ACUTE CYSTITIS WITH HEMATURIA: Primary | ICD-10-CM

## 2018-02-05 LAB
BILIRUBIN: NEGATIVE
GLUCOSE (URINE DIPSTICK): NEGATIVE MG/DL
KETONES (URINE DIPSTICK): NEGATIVE MG/DL
MULTISTIX LOT#: ABNORMAL NUMERIC
NITRITE, URINE: NEGATIVE
PH, URINE: 6 (ref 4.5–8)
PROTEIN (URINE DIPSTICK): 100 MG/DL
SPECIFIC GRAVITY: >=1.03 (ref 1–1.03)
UROBILINOGEN,SEMI-QN: 0.2 MG/DL (ref 0–1.9)

## 2018-02-05 PROCEDURE — 87086 URINE CULTURE/COLONY COUNT: CPT | Performed by: FAMILY MEDICINE

## 2018-02-05 PROCEDURE — 87077 CULTURE AEROBIC IDENTIFY: CPT | Performed by: FAMILY MEDICINE

## 2018-02-05 PROCEDURE — 99213 OFFICE O/P EST LOW 20 MIN: CPT | Performed by: FAMILY MEDICINE

## 2018-02-05 PROCEDURE — 81003 URINALYSIS AUTO W/O SCOPE: CPT | Performed by: FAMILY MEDICINE

## 2018-02-05 PROCEDURE — 87186 SC STD MICRODIL/AGAR DIL: CPT | Performed by: FAMILY MEDICINE

## 2018-02-05 RX ORDER — ESCITALOPRAM OXALATE 10 MG/1
15 TABLET ORAL DAILY
Refills: 0 | COMMUNITY
Start: 2018-01-22 | End: 2018-02-21 | Stop reason: ALTCHOICE

## 2018-02-05 RX ORDER — NITROFURANTOIN 25; 75 MG/1; MG/1
100 CAPSULE ORAL 2 TIMES DAILY
Qty: 14 CAPSULE | Refills: 0 | Status: SHIPPED | OUTPATIENT
Start: 2018-02-05 | End: 2018-02-05 | Stop reason: CLARIF

## 2018-02-05 RX ORDER — NITROFURANTOIN 25; 75 MG/1; MG/1
100 CAPSULE ORAL 2 TIMES DAILY
Qty: 14 CAPSULE | Refills: 0 | Status: SHIPPED | OUTPATIENT
Start: 2018-02-05 | End: 2018-02-21 | Stop reason: ALTCHOICE

## 2018-02-05 RX ORDER — DEXTROAMPHETAMINE SACCHARATE, AMPHETAMINE ASPARTATE MONOHYDRATE, DEXTROAMPHETAMINE SULFATE AND AMPHETAMINE SULFATE 5; 5; 5; 5 MG/1; MG/1; MG/1; MG/1
20 CAPSULE, EXTENDED RELEASE ORAL DAILY
Refills: 0 | COMMUNITY
Start: 2018-02-01 | End: 2018-04-16

## 2018-02-05 NOTE — PATIENT INSTRUCTIONS
Bladder Infection, Female (Adult)    Urine is normally doesn't have any bacteria in it. But bacteria can get into the urinary tract from the skin around the rectum. Or they can travel in the blood from elsewhere in the body.  Once they are in your urinary · Bowel incontinence  · Pregnancy  · Procedures such as having a catheter inserted  · Older age  · Not emptying your bladder. This can allow bacteria a chance to grow in your urine.   · Dehydration  · Constipation  · Sex  · Use of a diaphragm for birth cont · Avoid caffeine, alcohol, and spicy foods. These can irritate your bladder. · Urinate right after intercourse to flush out your bladder. · If you use birth control pills and have frequent bladder infections, discuss it with your doctor.   Follow-up care · Bacteria on the skin outside the rectum may travel into the urethra. This is more common in women since the rectum and urethra are closer to each other than in men.  Wiping from front to back after using the toilet and keeping the area clean can help prev

## 2018-02-06 RX ORDER — METOCLOPRAMIDE 10 MG/1
10 TABLET ORAL ONCE
Status: CANCELLED | OUTPATIENT
Start: 2018-02-06 | End: 2018-02-06

## 2018-02-06 NOTE — PROGRESS NOTES
Princess Rossi is a 28year old female. S:  Patient presents today with the following concerns:  · Patient complains of urinary frequency and urgency for 3 days. Some dysuria. No abdominal/back pain. No fever, N/V/D. · Had a tubal ligation. electrocardiogram     Abnormal blood chemistry     Abnormal weight gain     Back pain, chronic     Benign neoplasm of scalp and skin of neck     Benign neoplasm of skin of upper limb, including shoulder     Benign neoplasm of skin of trunk     Psychophysio three,cranial nerves are intact,motor and sensory are grossly intact    U/A reviewed. ASSESSMENT AND PLAN:  Jose Odonnell is a 28year old female.   Acute cystitis with hematuria  (primary encounter diagnosis)  Urinary pain      Orders Placed This

## 2018-02-07 ENCOUNTER — LAB ENCOUNTER (OUTPATIENT)
Dept: LAB | Age: 33
End: 2018-02-07
Attending: FAMILY MEDICINE
Payer: COMMERCIAL

## 2018-02-07 DIAGNOSIS — M19.90 ARTHRITIS: ICD-10-CM

## 2018-02-07 LAB
ALBUMIN SERPL-MCNC: 3.6 G/DL (ref 3.5–4.8)
ALP LIVER SERPL-CCNC: 88 U/L (ref 37–98)
ALT SERPL-CCNC: 26 U/L (ref 14–54)
AST SERPL-CCNC: 15 U/L (ref 15–41)
BASOPHILS # BLD AUTO: 0.04 X10(3) UL (ref 0–0.1)
BASOPHILS NFR BLD AUTO: 0.6 %
BILIRUB SERPL-MCNC: 0.4 MG/DL (ref 0.1–2)
BUN BLD-MCNC: 14 MG/DL (ref 8–20)
C-REACTIVE PROTEIN: 1.14 MG/DL (ref ?–1)
CALCIUM BLD-MCNC: 9.1 MG/DL (ref 8.3–10.3)
CHLORIDE: 105 MMOL/L (ref 101–111)
CO2: 27 MMOL/L (ref 22–32)
COMPLEMENT C3: 154 MG/DL (ref 90–180)
COMPLEMENT C4: 27.3 MG/DL (ref 10–40)
CREAT BLD-MCNC: 0.65 MG/DL (ref 0.55–1.02)
EOSINOPHIL # BLD AUTO: 0.09 X10(3) UL (ref 0–0.3)
EOSINOPHIL NFR BLD AUTO: 1.4 %
ERYTHROCYTE [DISTWIDTH] IN BLOOD BY AUTOMATED COUNT: 13.3 % (ref 11.5–16)
GLUCOSE BLD-MCNC: 90 MG/DL (ref 70–99)
HCT VFR BLD AUTO: 38.9 % (ref 34–50)
HGB BLD-MCNC: 12.8 G/DL (ref 12–16)
IMMATURE GRANULOCYTE COUNT: 0.03 X10(3) UL (ref 0–1)
IMMATURE GRANULOCYTE RATIO %: 0.5 %
LYMPHOCYTES # BLD AUTO: 2.18 X10(3) UL (ref 0.9–4)
LYMPHOCYTES NFR BLD AUTO: 33.6 %
M PROTEIN MFR SERPL ELPH: 7.4 G/DL (ref 6.1–8.3)
MCH RBC QN AUTO: 28.6 PG (ref 27–33.2)
MCHC RBC AUTO-ENTMCNC: 32.9 G/DL (ref 31–37)
MCV RBC AUTO: 86.8 FL (ref 81–100)
MONOCYTES # BLD AUTO: 0.45 X10(3) UL (ref 0.1–0.6)
MONOCYTES NFR BLD AUTO: 6.9 %
NEUTROPHIL ABS PRELIM: 3.69 X10 (3) UL (ref 1.3–6.7)
NEUTROPHILS # BLD AUTO: 3.69 X10(3) UL (ref 1.3–6.7)
NEUTROPHILS NFR BLD AUTO: 57 %
PLATELET # BLD AUTO: 328 10(3)UL (ref 150–450)
POTASSIUM SERPL-SCNC: 4 MMOL/L (ref 3.6–5.1)
RBC # BLD AUTO: 4.48 X10(6)UL (ref 3.8–5.1)
RED CELL DISTRIBUTION WIDTH-SD: 41.7 FL (ref 35.1–46.3)
SED RATE-ML: 13 MM/HR (ref 0–25)
SODIUM SERPL-SCNC: 141 MMOL/L (ref 136–144)
WBC # BLD AUTO: 6.5 X10(3) UL (ref 4–13)

## 2018-02-07 PROCEDURE — 80053 COMPREHEN METABOLIC PANEL: CPT

## 2018-02-07 PROCEDURE — 85652 RBC SED RATE AUTOMATED: CPT

## 2018-02-07 PROCEDURE — 86812 HLA TYPING A B OR C: CPT

## 2018-02-07 PROCEDURE — 86160 COMPLEMENT ANTIGEN: CPT

## 2018-02-07 PROCEDURE — 85025 COMPLETE CBC W/AUTO DIFF WBC: CPT

## 2018-02-07 PROCEDURE — 86140 C-REACTIVE PROTEIN: CPT

## 2018-02-07 PROCEDURE — 86747 PARVOVIRUS ANTIBODY: CPT

## 2018-02-08 LAB — HLA-B27: NEGATIVE

## 2018-02-09 LAB
PARVOVIRUS B19 ANTIBODY IGG: 0.72 IV
PARVOVIRUS B19 ANTIBODY IGM: 0.17 IV

## 2018-02-11 ENCOUNTER — ANESTHESIA EVENT (OUTPATIENT)
Dept: SURGERY | Facility: HOSPITAL | Age: 33
End: 2018-02-11
Payer: MEDICAID

## 2018-02-12 ENCOUNTER — ANESTHESIA (OUTPATIENT)
Dept: SURGERY | Facility: HOSPITAL | Age: 33
End: 2018-02-12
Payer: MEDICAID

## 2018-02-12 ENCOUNTER — HOSPITAL ENCOUNTER (OUTPATIENT)
Facility: HOSPITAL | Age: 33
Setting detail: HOSPITAL OUTPATIENT SURGERY
Discharge: HOME | End: 2018-02-12
Attending: OTOLARYNGOLOGY | Admitting: OTOLARYNGOLOGY
Payer: MEDICAID

## 2018-02-12 ENCOUNTER — SURGERY (OUTPATIENT)
Age: 33
End: 2018-02-12

## 2018-02-12 VITALS
DIASTOLIC BLOOD PRESSURE: 82 MMHG | OXYGEN SATURATION: 95 % | WEIGHT: 255 LBS | HEART RATE: 105 BPM | SYSTOLIC BLOOD PRESSURE: 135 MMHG | HEIGHT: 65 IN | RESPIRATION RATE: 15 BRPM | TEMPERATURE: 98 F | BODY MASS INDEX: 42.49 KG/M2

## 2018-02-12 DIAGNOSIS — J34.3 HYPERTROPHY OF NASAL TURBINATES: ICD-10-CM

## 2018-02-12 DIAGNOSIS — J34.2 DEVIATED NASAL SEPTUM: ICD-10-CM

## 2018-02-12 LAB — B-HCG UR QL: NEGATIVE

## 2018-02-12 PROCEDURE — S0077 INJECTION, CLINDAMYCIN PHOSP: HCPCS | Performed by: ANESTHESIOLOGY

## 2018-02-12 PROCEDURE — 30520 REPAIR OF NASAL SEPTUM: CPT | Performed by: OTOLARYNGOLOGY

## 2018-02-12 PROCEDURE — 09SL7ZZ REPOSITION NASAL TURBINATE, VIA NATURAL OR ARTIFICIAL OPENING: ICD-10-PCS | Performed by: OTOLARYNGOLOGY

## 2018-02-12 PROCEDURE — 30140 RESECT INFERIOR TURBINATE: CPT | Performed by: OTOLARYNGOLOGY

## 2018-02-12 PROCEDURE — 095L7ZZ DESTRUCTION OF NASAL TURBINATE, VIA NATURAL OR ARTIFICIAL OPENING: ICD-10-PCS | Performed by: OTOLARYNGOLOGY

## 2018-02-12 PROCEDURE — 09SM0ZZ REPOSITION NASAL SEPTUM, OPEN APPROACH: ICD-10-PCS | Performed by: OTOLARYNGOLOGY

## 2018-02-12 RX ORDER — CLINDAMYCIN PHOSPHATE 150 MG/ML
INJECTION, SOLUTION INTRAVENOUS AS NEEDED
Status: DISCONTINUED | OUTPATIENT
Start: 2018-02-12 | End: 2018-02-12 | Stop reason: SURG

## 2018-02-12 RX ORDER — ONDANSETRON 2 MG/ML
4 INJECTION INTRAMUSCULAR; INTRAVENOUS EVERY 6 HOURS PRN
Status: DISCONTINUED | OUTPATIENT
Start: 2018-02-12 | End: 2018-02-12

## 2018-02-12 RX ORDER — LIDOCAINE HYDROCHLORIDE 10 MG/ML
INJECTION, SOLUTION EPIDURAL; INFILTRATION; INTRACAUDAL; PERINEURAL AS NEEDED
Status: DISCONTINUED | OUTPATIENT
Start: 2018-02-12 | End: 2018-02-12 | Stop reason: SURG

## 2018-02-12 RX ORDER — ACETAMINOPHEN 160 MG/5ML
650 SOLUTION ORAL EVERY 4 HOURS PRN
Status: DISCONTINUED | OUTPATIENT
Start: 2018-02-12 | End: 2018-02-12

## 2018-02-12 RX ORDER — NALOXONE HYDROCHLORIDE 0.4 MG/ML
80 INJECTION, SOLUTION INTRAMUSCULAR; INTRAVENOUS; SUBCUTANEOUS AS NEEDED
Status: DISCONTINUED | OUTPATIENT
Start: 2018-02-12 | End: 2018-02-12

## 2018-02-12 RX ORDER — SODIUM CHLORIDE, SODIUM LACTATE, POTASSIUM CHLORIDE, CALCIUM CHLORIDE 600; 310; 30; 20 MG/100ML; MG/100ML; MG/100ML; MG/100ML
INJECTION, SOLUTION INTRAVENOUS CONTINUOUS
Status: DISCONTINUED | OUTPATIENT
Start: 2018-02-12 | End: 2018-02-12

## 2018-02-12 RX ORDER — HALOPERIDOL 5 MG/ML
0.25 INJECTION INTRAMUSCULAR ONCE AS NEEDED
Status: DISCONTINUED | OUTPATIENT
Start: 2018-02-12 | End: 2018-02-12

## 2018-02-12 RX ORDER — CLINDAMYCIN HYDROCHLORIDE 150 MG/1
150 CAPSULE ORAL EVERY 8 HOURS
Qty: 21 CAPSULE | Refills: 0 | Status: SHIPPED | OUTPATIENT
Start: 2018-02-12 | End: 2018-02-19

## 2018-02-12 RX ORDER — SODIUM CHLORIDE 0.9 % (FLUSH) 0.9 %
10 SYRINGE (ML) INJECTION AS NEEDED
Status: DISCONTINUED | OUTPATIENT
Start: 2018-02-12 | End: 2018-02-12

## 2018-02-12 RX ORDER — ONDANSETRON 2 MG/ML
4 INJECTION INTRAMUSCULAR; INTRAVENOUS ONCE AS NEEDED
Status: DISCONTINUED | OUTPATIENT
Start: 2018-02-12 | End: 2018-02-12

## 2018-02-12 RX ORDER — HYDROCODONE BITARTRATE AND ACETAMINOPHEN 5; 325 MG/1; MG/1
2 TABLET ORAL AS NEEDED
Status: DISCONTINUED | OUTPATIENT
Start: 2018-02-12 | End: 2018-02-12

## 2018-02-12 RX ORDER — ONDANSETRON 2 MG/ML
INJECTION INTRAMUSCULAR; INTRAVENOUS AS NEEDED
Status: DISCONTINUED | OUTPATIENT
Start: 2018-02-12 | End: 2018-02-12 | Stop reason: SURG

## 2018-02-12 RX ORDER — ACETAMINOPHEN 325 MG/1
650 TABLET ORAL EVERY 4 HOURS PRN
Status: DISCONTINUED | OUTPATIENT
Start: 2018-02-12 | End: 2018-02-12

## 2018-02-12 RX ORDER — MORPHINE SULFATE 4 MG/ML
4 INJECTION, SOLUTION INTRAMUSCULAR; INTRAVENOUS EVERY 10 MIN PRN
Status: DISCONTINUED | OUTPATIENT
Start: 2018-02-12 | End: 2018-02-12

## 2018-02-12 RX ORDER — ONDANSETRON 4 MG/1
4 TABLET, ORALLY DISINTEGRATING ORAL EVERY 6 HOURS PRN
Status: DISCONTINUED | OUTPATIENT
Start: 2018-02-12 | End: 2018-02-12

## 2018-02-12 RX ORDER — ACETAMINOPHEN 500 MG
1000 TABLET ORAL ONCE
Status: COMPLETED | OUTPATIENT
Start: 2018-02-12 | End: 2018-02-12

## 2018-02-12 RX ORDER — HYDROCODONE BITARTRATE AND ACETAMINOPHEN 5; 325 MG/1; MG/1
1 TABLET ORAL AS NEEDED
Status: DISCONTINUED | OUTPATIENT
Start: 2018-02-12 | End: 2018-02-12

## 2018-02-12 RX ORDER — HYDROCODONE BITARTRATE AND ACETAMINOPHEN 7.5; 325 MG/1; MG/1
1 TABLET ORAL EVERY 6 HOURS PRN
Qty: 30 TABLET | Refills: 0 | Status: SHIPPED | OUTPATIENT
Start: 2018-02-12 | End: 2018-04-17

## 2018-02-12 RX ORDER — FAMOTIDINE 20 MG/1
20 TABLET ORAL ONCE
Status: COMPLETED | OUTPATIENT
Start: 2018-02-12 | End: 2018-02-12

## 2018-02-12 RX ORDER — MIDAZOLAM HYDROCHLORIDE 1 MG/ML
INJECTION INTRAMUSCULAR; INTRAVENOUS AS NEEDED
Status: DISCONTINUED | OUTPATIENT
Start: 2018-02-12 | End: 2018-02-12 | Stop reason: SURG

## 2018-02-12 RX ORDER — HYDROCODONE BITARTRATE AND ACETAMINOPHEN 5; 325 MG/1; MG/1
1 TABLET ORAL EVERY 4 HOURS PRN
Status: DISCONTINUED | OUTPATIENT
Start: 2018-02-12 | End: 2018-02-12

## 2018-02-12 RX ORDER — MORPHINE SULFATE 10 MG/ML
6 INJECTION, SOLUTION INTRAMUSCULAR; INTRAVENOUS EVERY 10 MIN PRN
Status: DISCONTINUED | OUTPATIENT
Start: 2018-02-12 | End: 2018-02-12

## 2018-02-12 RX ORDER — DEXAMETHASONE SODIUM PHOSPHATE 4 MG/ML
VIAL (ML) INJECTION AS NEEDED
Status: DISCONTINUED | OUTPATIENT
Start: 2018-02-12 | End: 2018-02-12 | Stop reason: SURG

## 2018-02-12 RX ORDER — SODIUM CHLORIDE/ALOE VERA
GEL (GRAM) NASAL AS NEEDED
Status: DISCONTINUED | OUTPATIENT
Start: 2018-02-12 | End: 2018-02-12 | Stop reason: HOSPADM

## 2018-02-12 RX ORDER — LIDOCAINE HYDROCHLORIDE AND EPINEPHRINE 10; 10 MG/ML; UG/ML
INJECTION, SOLUTION INFILTRATION; PERINEURAL AS NEEDED
Status: DISCONTINUED | OUTPATIENT
Start: 2018-02-12 | End: 2018-02-12 | Stop reason: HOSPADM

## 2018-02-12 RX ORDER — MORPHINE SULFATE 2 MG/ML
2 INJECTION, SOLUTION INTRAMUSCULAR; INTRAVENOUS EVERY 10 MIN PRN
Status: DISCONTINUED | OUTPATIENT
Start: 2018-02-12 | End: 2018-02-12

## 2018-02-12 RX ADMIN — CLINDAMYCIN PHOSPHATE 600 MG: 150 INJECTION, SOLUTION INTRAVENOUS at 08:48:00

## 2018-02-12 RX ADMIN — ONDANSETRON 4 MG: 2 INJECTION INTRAMUSCULAR; INTRAVENOUS at 09:06:00

## 2018-02-12 RX ADMIN — LIDOCAINE HYDROCHLORIDE 100 MG: 10 INJECTION, SOLUTION EPIDURAL; INFILTRATION; INTRACAUDAL; PERINEURAL at 08:43:00

## 2018-02-12 RX ADMIN — MIDAZOLAM HYDROCHLORIDE 2 MG: 1 INJECTION INTRAMUSCULAR; INTRAVENOUS at 08:40:00

## 2018-02-12 RX ADMIN — SODIUM CHLORIDE, SODIUM LACTATE, POTASSIUM CHLORIDE, CALCIUM CHLORIDE: 600; 310; 30; 20 INJECTION, SOLUTION INTRAVENOUS at 09:25:00

## 2018-02-12 RX ADMIN — DEXAMETHASONE SODIUM PHOSPHATE 4 MG: 4 MG/ML VIAL (ML) INJECTION at 09:06:00

## 2018-02-12 NOTE — OPERATIVE REPORT
[facility]    PATIENT'S NAME: Bri Newman   ATTENDING PHYSICIAN: Serg Odonnell. Jolene Hdz MD   OPERATING PHYSICIAN: Serg Odonnell.  Jolene Hdz MD   PATIENT ACCOUNT#:   [id number]    LOCATION:  [place] [address]  MEDICAL RECORD #:   [id number]       DATE and cartilage including a high spur on the right. The mucoperichondrial flaps were then laid in the midline position and sewn into this midline position using a 4-0 plain gut transeptal mattress stitch.   The hemitransfixion incision was then closed using

## 2018-02-12 NOTE — ANESTHESIA PREPROCEDURE EVALUATION
Anesthesia PreOp Note    HPI:     Luciano Cota is a 28year old female who presents for preoperative consultation requested by: Troy Matos MD    Date of Surgery: 2/12/2018    Procedure(s):  NASAL SEPTOPLASTY TURBINECTOMY  Indication: Deviated n malfunction         Date Noted: 12/27/2010      Major depressive disorder, single episode         Date Noted: 12/27/2010      Abnormal electrocardiogram         Date Noted: 08/04/2009      Alopecia         Date Noted: 06/04/2009      Obesity         Date N MG/3ML Inhalation Nebu Soln USE 1 VIAL VIA NEBULIZER EVERY 6 HOURS AS NEEDED FOR WHEEZING Disp: 300 mL Rfl: 6 Past Month at Unknown time   Albuterol Sulfate HFA (PROAIR HFA) 108 (90 BASE) MCG/ACT Inhalation Aero Soln Inhale 2 puffs into the lungs every 4 ( Smoking status: Never Smoker    Smokeless tobacco: Never Used    Alcohol use No    Drug use: No    Sexual activity: Not on file     Other Topics Concern    Caffeine Concern No    Sleep Concern Yes    Comment: pain interferes with sleep    Exercise No    Se Plan and Risks Discussed With:  Patient and spouse  Discussed plan with:  Surgeon      I have informed Cecil Bonner  of the nature of the anesthetic plan, benefits, risks, major complications, and any alternative forms of anesthetic management.    Al

## 2018-02-12 NOTE — ANESTHESIA POSTPROCEDURE EVALUATION
Patient: Napoleon Oro    Procedure Summary     Date:  02/12/18 Room / Location:  [address] [place] [address] MAIN OR 02 / [address] [place] [address] MAIN OR    Anesthesia Start:  0761 Anesthesia Stop:  0752    Procedure:  NASAL SEPTOPLASTY TURBINECTOMY (Bilateral Nose) Diagnosis:       Deviated

## 2018-02-12 NOTE — BRIEF OP NOTE
Pre-Operative Diagnosis: Deviated nasal septum [J34.2]  Hypertrophy of nasal turbinates [J34.3]     Post-Operative Diagnosis: Deviated nasal septum [X39. 2]Hypertrophy of nasal turbinates [J34.3]     Procedure Performed:   Procedure(s):  Septoplasty, sub

## 2018-02-12 NOTE — H&P
HISTORY OF PRESENT ILLNESS  She developed right ear fullness after having an acute URI about 2-3 weeks ago. Seven Valentine had been on a zPak previously and developed severe pain followed by bloody/clear drainage from the right ear.  Infection improved on antibiotic Diagnosis Date   • Asthma     • Constipation, chronic     • Hypothyroidism     • Spondylisthesis           Past Surgical History:  , , , 2016:   12: ELECTROCARDIOGRAM, COMPLETE      Comment: Scanned to Media Tab  : OTHER SURG Normal, Left: Normal.   Skin Normal Inspection - Normal.           Lymph Detail Normal Submental. Submandibular.  Anterior cervical. Posterior cervical. Supraclavicular.           Nose/Mouth/Throat Normal External nose - Normal. Lips/teeth/gums - Normal. To ASSESSMENT AND PLAN     1. Deviated nasal septum  Deviated to the right. Reflexive inferior turbinate hypertrophy.   We discussed septoplasty as well as reduction of inferior turbinates she has had no improvement to the use of any allergy medications or

## 2018-02-12 NOTE — INTERVAL H&P NOTE
Pre-op Diagnosis: Deviated nasal septum [J34.2]  Hypertrophy of nasal turbinates [J34.3]    The above referenced H&P was reviewed by Shi Ba.  Manda Cifuentes MD on 2/12/2018, the patient was examined and no significant changes have occurred in the patient's conditi

## 2018-02-13 ENCOUNTER — TELEPHONE (OUTPATIENT)
Dept: OTOLARYNGOLOGY | Facility: CLINIC | Age: 33
End: 2018-02-13

## 2018-02-13 NOTE — TELEPHONE ENCOUNTER
Pt is post op day 1, septoplasty/SMR. Per pt, pain is 3-4/10; bloody drainage has subsided, it is now a clear drainage with a tinge of blood.     Pt advised no nose blowing, sneeze with mouth open, no heavy lifting or bending for up to two weeks post op; m

## 2018-02-20 ENCOUNTER — OFFICE VISIT (OUTPATIENT)
Dept: OTOLARYNGOLOGY | Facility: CLINIC | Age: 33
End: 2018-02-20

## 2018-02-20 VITALS
WEIGHT: 255 LBS | HEIGHT: 64 IN | BODY MASS INDEX: 43.54 KG/M2 | DIASTOLIC BLOOD PRESSURE: 72 MMHG | TEMPERATURE: 98 F | SYSTOLIC BLOOD PRESSURE: 98 MMHG

## 2018-02-20 DIAGNOSIS — J34.2 DEVIATED NASAL SEPTUM: Primary | ICD-10-CM

## 2018-02-20 PROCEDURE — 99024 POSTOP FOLLOW-UP VISIT: CPT | Performed by: OTOLARYNGOLOGY

## 2018-02-20 PROCEDURE — 99212 OFFICE O/P EST SF 10 MIN: CPT | Performed by: OTOLARYNGOLOGY

## 2018-02-20 NOTE — PROGRESS NOTES
Myrna Irizarry is a 28year old female.   Patient presents with:  Post-Op: septoplasty and smr done on 2/12/18,       HISTORY OF PRESENT ILLNESS  She developed right ear fullness after having an acute URI about 2-3 weeks ago. Cody Mg had been on a zPak pr Paternal Grandfather    • Stroke Paternal Grandfather    • Other [OTHER] Paternal Grandfather    • Thyroid Disorder Mother      Hypothyroidism   • Other [OTHER] Mother      Fibromyalgia   • Thyroid Disorder Sister      Hypothyroidism       Past Medical His Normal. Fundus - Right: Normal, Left: Normal.   Neurological Normal Memory - Normal. Cranial nerves - Cranial nerves II through XII grossly intact.    Head/Face Normal Facial features - Normal. Eyebrows - Normal. Skull - Normal.        Nasopharynx Normal Ex Inhaler, Rfl: 6  •  Nitrofurantoin Monohyd Macro (MACROBID) 100 MG Oral Cap, Take 1 capsule (100 mg total) by mouth 2 (two) times daily. , Disp: 14 capsule, Rfl: 0  ASSESSMENT AND PLAN    1. Deviated nasal septum  Septum is midline.   She notes significant i

## 2018-02-21 VITALS — WEIGHT: 250 LBS | BODY MASS INDEX: 41.65 KG/M2 | HEIGHT: 65 IN

## 2018-02-21 RX ORDER — CETIRIZINE HYDROCHLORIDE 10 MG/1
10 TABLET ORAL DAILY
COMMUNITY
End: 2018-04-16 | Stop reason: ALTCHOICE

## 2018-02-21 RX ORDER — MELOXICAM 7.5 MG/1
7.5 TABLET ORAL 2 TIMES DAILY PRN
COMMUNITY
End: 2018-03-23

## 2018-02-25 DIAGNOSIS — M25.512 ACUTE PAIN OF LEFT SHOULDER: ICD-10-CM

## 2018-02-25 DIAGNOSIS — M25.561 CHRONIC PAIN OF BOTH KNEES: ICD-10-CM

## 2018-02-25 DIAGNOSIS — G89.29 CHRONIC PAIN OF BOTH KNEES: ICD-10-CM

## 2018-02-25 DIAGNOSIS — M25.562 CHRONIC PAIN OF BOTH KNEES: ICD-10-CM

## 2018-02-26 RX ORDER — MELOXICAM 7.5 MG/1
TABLET ORAL
Qty: 60 TABLET | Refills: 0 | Status: SHIPPED | OUTPATIENT
Start: 2018-02-26 | End: 2018-04-17

## 2018-02-26 NOTE — TELEPHONE ENCOUNTER
LOV 2/5/2018     Future Appointments  Date Time Provider Murray Blackburn   3/23/2018 9:30 AM Steve Horton,  EM[facility] 3 [facility]   3/27/2018 7:30 AM EH XR RM2 ([facility] [place] FLUOROSCOPY) [address] [facility]   3/27/2018 8:30 AM [address] MAIN RM3 [address] [facility]

## 2018-03-23 ENCOUNTER — OFFICE VISIT (OUTPATIENT)
Dept: FAMILY MEDICINE CLINIC | Facility: CLINIC | Age: 33
End: 2018-03-23

## 2018-03-23 VITALS
HEART RATE: 106 BPM | TEMPERATURE: 98 F | BODY MASS INDEX: 41.17 KG/M2 | SYSTOLIC BLOOD PRESSURE: 100 MMHG | WEIGHT: 256.19 LBS | HEIGHT: 66.2 IN | RESPIRATION RATE: 14 BRPM | DIASTOLIC BLOOD PRESSURE: 70 MMHG

## 2018-03-23 DIAGNOSIS — J45.40 MODERATE PERSISTENT ASTHMA WITHOUT COMPLICATION: Primary | ICD-10-CM

## 2018-03-23 DIAGNOSIS — M25.50 ARTHRALGIA OF MULTIPLE JOINTS: ICD-10-CM

## 2018-03-23 DIAGNOSIS — F41.0 PANIC ANXIETY SYNDROME: ICD-10-CM

## 2018-03-23 DIAGNOSIS — E03.4 HYPOTHYROIDISM DUE TO ACQUIRED ATROPHY OF THYROID: ICD-10-CM

## 2018-03-23 DIAGNOSIS — F32.4 MAJOR DEPRESSIVE DISORDER WITH SINGLE EPISODE, IN PARTIAL REMISSION (HCC): ICD-10-CM

## 2018-03-23 DIAGNOSIS — M54.16 CHRONIC LUMBAR RADICULOPATHY: ICD-10-CM

## 2018-03-23 PROCEDURE — 99214 OFFICE O/P EST MOD 30 MIN: CPT | Performed by: FAMILY MEDICINE

## 2018-03-23 RX ORDER — DULOXETIN HYDROCHLORIDE 60 MG/1
1 CAPSULE, DELAYED RELEASE ORAL DAILY
Refills: 0 | COMMUNITY
Start: 2018-02-21 | End: 2018-04-16

## 2018-03-23 NOTE — PROGRESS NOTES
Chief Complaint:  Patient presents with:  Low Back Pain: and neck pain  Asthma: ACT done- flovent and Advair are too expensive  Menstrual Problem: weird periods- with three weeks of brown draining after four days of regular period    HPI:  This is a 32 yea significantly impacted her ability to function and often has difficulty ambulating, opening jars ,etc due to joint pains. ROS: Review of systems performed and negative unless stated in HPI.     Past medical, family and social history reviewed and listed 0.5 mg by mouth 2 (two) times daily. Disp:  Rfl:    Levothyroxine Sodium (SYNTHROID) 75 MCG Oral Tab Take 75 mcg by mouth. Disp:  Rfl:    ALPRAZolam 0.5 MG Oral Tab Take 0.5 mg by mouth nightly as needed for Sleep.  Disp:  Rfl:    Spacer/Aero Chamber Mouthp complication - Primary    Relevant Medications    Fluticasone Propionate HFA (FLOVENT HFA) 110 MCG/ACT Inhalation Aerosol       Endocrine    Hypothyroidism due to acquired atrophy of thyroid    Relevant Orders    ENDOCRINOLOGY - INTERNAL       Neurologic

## 2018-03-26 ENCOUNTER — TELEPHONE (OUTPATIENT)
Dept: SURGERY | Facility: CLINIC | Age: 33
End: 2018-03-26

## 2018-03-26 NOTE — TELEPHONE ENCOUNTER
pt can't schedule the myleogram at this time/no .  Wants to know if there is anything she can do in the meantime, please call

## 2018-03-27 ENCOUNTER — HOSPITAL ENCOUNTER (OUTPATIENT)
Dept: CT IMAGING | Facility: HOSPITAL | Age: 33
Discharge: HOME | End: 2018-03-27
Attending: PHYSICIAN ASSISTANT
Payer: COMMERCIAL

## 2018-03-27 ENCOUNTER — HOSPITAL ENCOUNTER (OUTPATIENT)
Dept: GENERAL RADIOLOGY | Facility: HOSPITAL | Age: 33
End: 2018-03-27
Attending: PHYSICIAN ASSISTANT
Payer: COMMERCIAL

## 2018-03-27 NOTE — TELEPHONE ENCOUNTER
Informed patient of PA response. She will try to get imaging done as soon as she can arrange it with her  issues. Verbalized understanding.

## 2018-04-16 ENCOUNTER — OFFICE VISIT (OUTPATIENT)
Dept: FAMILY MEDICINE CLINIC | Facility: CLINIC | Age: 33
End: 2018-04-16

## 2018-04-16 VITALS
SYSTOLIC BLOOD PRESSURE: 118 MMHG | HEART RATE: 86 BPM | RESPIRATION RATE: 14 BRPM | HEIGHT: 65 IN | WEIGHT: 254 LBS | TEMPERATURE: 98 F | BODY MASS INDEX: 42.32 KG/M2 | DIASTOLIC BLOOD PRESSURE: 80 MMHG

## 2018-04-16 DIAGNOSIS — J30.81 ALLERGIC RHINITIS DUE TO ANIMAL HAIR AND DANDER: ICD-10-CM

## 2018-04-16 DIAGNOSIS — F41.0 PANIC ANXIETY SYNDROME: ICD-10-CM

## 2018-04-16 DIAGNOSIS — N92.6 IRREGULAR PERIODS/MENSTRUAL CYCLES: Primary | ICD-10-CM

## 2018-04-16 DIAGNOSIS — F32.4 MAJOR DEPRESSIVE DISORDER WITH SINGLE EPISODE, IN PARTIAL REMISSION (HCC): ICD-10-CM

## 2018-04-16 PROCEDURE — 99214 OFFICE O/P EST MOD 30 MIN: CPT | Performed by: FAMILY MEDICINE

## 2018-04-16 RX ORDER — DULOXETIN HYDROCHLORIDE 60 MG/1
60 CAPSULE, DELAYED RELEASE ORAL DAILY
Qty: 30 CAPSULE | Refills: 1 | Status: SHIPPED | OUTPATIENT
Start: 2018-04-16

## 2018-04-16 RX ORDER — DEXTROAMPHETAMINE SACCHARATE, AMPHETAMINE ASPARTATE MONOHYDRATE, DEXTROAMPHETAMINE SULFATE AND AMPHETAMINE SULFATE 5; 5; 5; 5 MG/1; MG/1; MG/1; MG/1
20 CAPSULE, EXTENDED RELEASE ORAL DAILY
Qty: 30 CAPSULE | Refills: 0 | Status: SHIPPED | OUTPATIENT
Start: 2018-04-16

## 2018-04-16 RX ORDER — AZELASTINE 1 MG/ML
1 SPRAY, METERED NASAL 2 TIMES DAILY
Qty: 1 BOTTLE | Refills: 4 | Status: SHIPPED | OUTPATIENT
Start: 2018-04-16

## 2018-04-16 RX ORDER — CLONAZEPAM 0.5 MG/1
0.5 TABLET ORAL 2 TIMES DAILY
Qty: 60 TABLET | Refills: 0 | Status: SHIPPED | OUTPATIENT
Start: 2018-04-16

## 2018-04-16 NOTE — PROGRESS NOTES
Chief Complaint:  Patient presents with:  Menstrual Problem: Pt had a tubal ligation and she states has had 2 periods in a month and has been going on for 2 years.    Medication Request: Pt states Zyrtec interacts with her Cymbalta and it makes her sleep  M in HPI. Past medical, family and social history reviewed and listed as below.     HISTORY:  Past Medical History:   Diagnosis Date   • Anxiety state    • Asthma    • Back problem     SPONDYLOLISTHESIS   • Constipation, chronic    • Depression    • Disord DAILY Disp: 60 tablet Rfl: 0   hydrocodone-acetaminophen (NORCO) 7.5-325 MG Oral Tab Take 1 tablet by mouth every 6 (six) hours as needed. Disp: 30 tablet Rfl: 0   Levothyroxine Sodium (SYNTHROID) 75 MCG Oral Tab Take 75 mcg by mouth.  Disp:  Rfl:    Delight Idler Medications    DULoxetine HCl 60 MG Oral Cap DR Particles    Amphetamine-Dextroamphet ER 20 MG Oral Capsule SR 24 Hr    ClonazePAM 0.5 MG Oral Tab      Other Visit Diagnoses     Irregular periods/menstrual cycles    -  Primary    Relevant Orders    [facility] DO Clifford  4/16/2018 12:21 PM  Family Medicine

## 2018-04-25 ENCOUNTER — TELEPHONE (OUTPATIENT)
Dept: FAMILY MEDICINE CLINIC | Facility: CLINIC | Age: 33
End: 2018-04-25

## 2018-04-25 NOTE — TELEPHONE ENCOUNTER
Received fax from [employer], prior authorization required for D-Amphetamine Salt Com Er 20MG.  Called patient and explained process, fax in triage

## 2018-04-27 NOTE — TELEPHONE ENCOUNTER
[place]  ADULT diagnosis of ADHD by specialist or Neurologist with accompanying forms. I called the patient to inform of this. She states she does have a psychiatrist who writes the scripts.  Advised he

## 2018-04-28 ENCOUNTER — HOSPITAL ENCOUNTER (OUTPATIENT)
Dept: GENERAL RADIOLOGY | Facility: HOSPITAL | Age: 33
Discharge: HOME | End: 2018-04-28
Attending: INTERNAL MEDICINE
Payer: MEDICAID

## 2018-04-28 ENCOUNTER — LAB ENCOUNTER (OUTPATIENT)
Dept: LAB | Facility: HOSPITAL | Age: 33
End: 2018-04-28
Attending: FAMILY MEDICINE
Payer: MEDICAID

## 2018-04-28 DIAGNOSIS — N92.6 IRREGULAR PERIODS/MENSTRUAL CYCLES: ICD-10-CM

## 2018-04-28 DIAGNOSIS — M19.049 HAND ARTHRITIS: ICD-10-CM

## 2018-04-28 PROCEDURE — 83002 ASSAY OF GONADOTROPIN (LH): CPT

## 2018-04-28 PROCEDURE — 82670 ASSAY OF TOTAL ESTRADIOL: CPT

## 2018-04-28 PROCEDURE — 83001 ASSAY OF GONADOTROPIN (FSH): CPT

## 2018-04-28 PROCEDURE — 84403 ASSAY OF TOTAL TESTOSTERONE: CPT

## 2018-04-28 PROCEDURE — 36415 COLL VENOUS BLD VENIPUNCTURE: CPT

## 2018-04-28 PROCEDURE — 73130 X-RAY EXAM OF HAND: CPT | Performed by: INTERNAL MEDICINE

## 2018-04-30 NOTE — PROGRESS NOTES
Discussed test results with Imtiaz Rahman by phone telling her labs look great per Dany Foster. Patient verbalizes understanding.

## 2018-05-05 ENCOUNTER — HOSPITAL ENCOUNTER (OUTPATIENT)
Dept: ULTRASOUND IMAGING | Facility: HOSPITAL | Age: 33
Discharge: HOME | End: 2018-05-05
Attending: FAMILY MEDICINE
Payer: MEDICAID

## 2018-05-05 ENCOUNTER — TELEPHONE (OUTPATIENT)
Dept: FAMILY MEDICINE CLINIC | Facility: CLINIC | Age: 33
End: 2018-05-05

## 2018-05-05 DIAGNOSIS — N92.6 IRREGULAR PERIODS/MENSTRUAL CYCLES: ICD-10-CM

## 2018-05-05 DIAGNOSIS — E03.8 SUBCLINICAL HYPOTHYROIDISM: Primary | ICD-10-CM

## 2018-05-05 PROCEDURE — 76830 TRANSVAGINAL US NON-OB: CPT | Performed by: FAMILY MEDICINE

## 2018-05-05 PROCEDURE — 76856 US EXAM PELVIC COMPLETE: CPT | Performed by: FAMILY MEDICINE

## 2018-05-05 NOTE — TELEPHONE ENCOUNTER
Dr. Jovanna Connelly received an order request from patient's endocrinologist:  TSH, Free T4  Dx subclinical hypothyroidism  Arian Queen MD    Patient just had blood done on 4/28/18 (thyroid labs were not included).  Labs ordered to [address] lab - patient may go have

## 2018-05-09 ENCOUNTER — APPOINTMENT (OUTPATIENT)
Dept: LAB | Age: 33
End: 2018-05-09
Attending: FAMILY MEDICINE
Payer: MEDICAID

## 2018-05-09 DIAGNOSIS — E03.8 SUBCLINICAL HYPOTHYROIDISM: ICD-10-CM

## 2018-05-09 PROCEDURE — 84443 ASSAY THYROID STIM HORMONE: CPT

## 2018-05-09 PROCEDURE — 84439 ASSAY OF FREE THYROXINE: CPT

## 2021-10-02 NOTE — TELEPHONE ENCOUNTER
See previous note. Pt was unable to reach the providers whose numbers you gave her. She wants to continue the care that she was receiving for her depression prior to her insurance change. Do you want to refer her to the [place] [id number]? Routed t none Addy Lovell

## (undated) DEVICE — SUTURE PLAIN GUT 5-0 PC-1

## (undated) DEVICE — HEAD & NECK: Brand: MEDLINE INDUSTRIES, INC.

## (undated) DEVICE — REM POLYHESIVE ADULT PATIENT RETURN ELECTRODE: Brand: VALLEYLAB

## (undated) DEVICE — SOL  .9 1000ML BTL

## (undated) DEVICE — NASAL ACCESSORY: Brand: MEDLINE INDUSTRIES, INC.

## (undated) DEVICE — EYE PADSSTERILENOT MADE WITH NATURAL RUBBER LATEXSINGLE USE ONLYDO NOT USE IF PACKAGE OPENED OR DAMAGED: Brand: CARDINAL HEALTH

## (undated) DEVICE — PACKING 8CM LNG SPT NSL STNG

## (undated) DEVICE — REFLEX ULTRA 45 WITH INTEGRATED CABLE: Brand: COBLATION

## (undated) DEVICE — GOWN SURG AERO BLUE PERF LG

## (undated) DEVICE — SUTURE PLAIN GUT 4-0 SC-1

## (undated) DEVICE — APPLICATOR COTTON TIP 3 10/PK

## (undated) DEVICE — SUCTION CANISTER, 3000CC,SAFELINER: Brand: DEROYAL

## (undated) DEVICE — STERILE LATEX POWDER-FREE SURGICAL GLOVESWITH NITRILE COATING: Brand: PROTEXIS

## (undated) NOTE — LETTER
ASTHMA ACTION PLAN for Addie Grady     : 3/29/1985     Date: 3/23/2018  Provider:  Georgiana Harden DO  Phone for doctor or clinic: [facility], [address] [facility], [address] ,[address]  [address] [place] [address]

## (undated) NOTE — LETTER
May 2, 2018    [address] [place] [address] [address]      Dear James Prather:    The results from your recent tests ordered are in.      Bing Rousseau left hand x-ray also shows the shorter ulna bone.  No bony damage from arthritis is noted.      A

## (undated) NOTE — ED AVS SNAPSHOT
[facility] Emergency Department    [place] [address] [address]    Phone:  [phone]    Fax:  Patsy Nieto   MRN: [id number]    Department:  [facility] Emergency Department   Date of Visit:  4/ IF THERE IS ANY CHANGE OR WORSENING OF YOUR CONDITION, CALL YOUR PRIMARY CARE PHYSICIAN AT ONCE OR RETURN IMMEDIATELY TO THE EMERGENCY DEPARTMENT.     If you have been prescribed any medication(s), please fill your prescription right away and begin taking t

## (undated) NOTE — LETTER
February 12, 2018      [address] [place] [address] [address]       Dear Des López:      The following are the results of your recent tests ordered by Dr. Nellie Rodriguez. Please review the list of test results.   Your result is the value on the RBC 4.48 3.80 - 5.10 x10(6)uL   HGB 12.8 12.0 - 16.0 g/dL   HCT 38.9 34.0 - 50.0 %   .0 150.0 - 450.0 10(3)uL   MCV 86.8 81.0 - 100.0 fL   MCH 28.6 27.0 - 33.2 pg   MCHC 32.9 31.0 - 37.0 g/dL   RDW 13.3 11.5 - 16.0 %   RDW-SD 41.7 35.1 - 46.3 fL   N

## (undated) NOTE — LETTER
ASTHMA ACTION PLAN for Misha Baker     : 3/29/1985     Date: 2017  Provider:  María Elena Longo DO  Phone for doctor or clinic: [address], [address] [facility], [address] [address] [place] [address] [place], [place] [id number]

## (undated) NOTE — ED AVS SNAPSHOT
[facility] Emergency Department    [place] [address]    Phone:  [phone]    Fax:  Patsy Elam De Ean   MRN: [id number]    Department:  [facility] Emergency Department   Date of Visit:  4/ Quantity:  1 Inhaler   Inhale 2 puffs into the lungs every 4 (four) hours as needed for Wheezing. What changed: This medication is already on your medication list.  Do NOT take both doses of the new and old medication.   ONLY take the dose prescribed tod visit does not uncover every injury or illness.  If you have been referred to a primary care or a specialist physician for a follow-up visit, please tell this physician (or your personal doctor if your instructions are to return to your personal doctor) abo [address] ([address]) [phone]                Additional Information       We are concerned for your overall well being:    - If you are a smoker or have smoked in the last 12 months, we encourage you to explore options for [facility]CCLELL[facility] [employer]Bridge will allow you to access patient instructions from your recent visit,  view other health information, and more. To sign up or find more information, go to [email or web address] and click on the Sign Up Now link in the [employer] box.      Enter

## (undated) NOTE — LETTER
May 2, 2018    [address] [facility] [address]       Dear [employer]:    The results from your recent tests ordered are in.     The x-rays don't show any bony damage to the hand or wrist.  One of the forearm bones, the ulna is a li